# Patient Record
Sex: FEMALE | Race: WHITE | URBAN - METROPOLITAN AREA
[De-identification: names, ages, dates, MRNs, and addresses within clinical notes are randomized per-mention and may not be internally consistent; named-entity substitution may affect disease eponyms.]

---

## 2017-10-19 ENCOUNTER — IMPORTED ENCOUNTER (OUTPATIENT)
Dept: URBAN - METROPOLITAN AREA CLINIC 50 | Facility: CLINIC | Age: 67
End: 2017-10-19

## 2020-12-04 ENCOUNTER — IMPORTED ENCOUNTER (OUTPATIENT)
Dept: URBAN - METROPOLITAN AREA CLINIC 50 | Facility: CLINIC | Age: 70
End: 2020-12-04

## 2020-12-04 NOTE — PATIENT DISCUSSION
"""OU: mild tortuosity. OS: av nicking. Patient has a history of high blood pressure.  Advised patient ""

## 2021-04-17 ASSESSMENT — VISUAL ACUITY
OS_CC: J1
OD_CC: 20/20
OS_CC: 20/20
OS_CC: J1+
OS_OTHER: 20/25. 20/40.
OD_BAT: 20/30
OD_PH: 20/40
OD_OTHER: 20/30.
OS_CC: 20/25-
OD_CC: 20/50-+
OS_BAT: 20/25
OD_OTHER: 20/30. 20/40.
OD_CC: J1+
OD_CC: J1
OS_OTHER: 20/30.

## 2021-04-17 ASSESSMENT — TONOMETRY
OD_IOP_MMHG: 12
OD_IOP_MMHG: 12
OS_IOP_MMHG: 12
OS_IOP_MMHG: 12

## 2021-12-01 ENCOUNTER — PREPPED CHART (OUTPATIENT)
Dept: URBAN - METROPOLITAN AREA CLINIC 49 | Facility: CLINIC | Age: 71
End: 2021-12-01

## 2021-12-03 ENCOUNTER — COMPREHENSIVE EXAM (OUTPATIENT)
Dept: URBAN - METROPOLITAN AREA CLINIC 49 | Facility: CLINIC | Age: 71
End: 2021-12-03

## 2021-12-03 DIAGNOSIS — H25.813: ICD-10-CM

## 2021-12-03 DIAGNOSIS — H02.834: ICD-10-CM

## 2021-12-03 DIAGNOSIS — H02.831: ICD-10-CM

## 2021-12-03 DIAGNOSIS — H35.013: ICD-10-CM

## 2021-12-03 PROCEDURE — 92014 COMPRE OPH EXAM EST PT 1/>: CPT

## 2021-12-03 NOTE — PATIENT DISCUSSION
Mild hypertensive changes OU. Patient has a history of high blood pressure. Advised patient to continue care with PCP and to monitor blood pressure.

## 2021-12-06 ASSESSMENT — VISUAL ACUITY
OU_CC: J1+
OD_GLARE: 20/40
OD_GLARE: 20/80
OU_CC: 20/20
OD_CC: 20/25
OS_GLARE: 20/40
OS_GLARE: 20/25
OS_CC: 20/25+

## 2021-12-06 ASSESSMENT — TONOMETRY
OS_IOP_MMHG: 12
OD_IOP_MMHG: 12

## 2023-01-30 ENCOUNTER — COMPREHENSIVE EXAM (OUTPATIENT)
Dept: URBAN - METROPOLITAN AREA CLINIC 53 | Facility: CLINIC | Age: 73
End: 2023-01-30

## 2023-01-30 DIAGNOSIS — H35.013: ICD-10-CM

## 2023-01-30 DIAGNOSIS — H25.813: ICD-10-CM

## 2023-01-30 DIAGNOSIS — H16.223: ICD-10-CM

## 2023-01-30 DIAGNOSIS — H52.4: ICD-10-CM

## 2023-01-30 DIAGNOSIS — H02.831: ICD-10-CM

## 2023-01-30 PROCEDURE — 92014 COMPRE OPH EXAM EST PT 1/>: CPT

## 2023-01-30 PROCEDURE — 92015 DETERMINE REFRACTIVE STATE: CPT

## 2023-01-30 ASSESSMENT — KERATOMETRY
OD_AXISANGLE_DEGREES: 96
OS_K2POWER_DIOPTERS: 45.50
OD_K1POWER_DIOPTERS: 44.25
OD_AXISANGLE2_DEGREES: 6
OS_AXISANGLE_DEGREES: 59
OS_K1POWER_DIOPTERS: 45.00
OD_K2POWER_DIOPTERS: 46.00
OS_AXISANGLE2_DEGREES: 149

## 2023-01-30 ASSESSMENT — VISUAL ACUITY
OU_CC: 20/20-1
OD_GLARE: 20/30
OS_GLARE: 20/40
OS_CC: 20/30
OD_CC: 20/25
OS_PH: 20/25
OD_GLARE: 20/50
OS_GLARE: 20/60
OU_SC: J1

## 2023-01-30 ASSESSMENT — TONOMETRY
OD_IOP_MMHG: 12
OS_IOP_MMHG: 11

## 2024-02-12 ENCOUNTER — COMPREHENSIVE EXAM (OUTPATIENT)
Dept: URBAN - METROPOLITAN AREA CLINIC 53 | Facility: CLINIC | Age: 74
End: 2024-02-12

## 2024-02-12 DIAGNOSIS — H16.223: ICD-10-CM

## 2024-02-12 DIAGNOSIS — H02.831: ICD-10-CM

## 2024-02-12 DIAGNOSIS — H02.834: ICD-10-CM

## 2024-02-12 DIAGNOSIS — H52.4: ICD-10-CM

## 2024-02-12 DIAGNOSIS — H35.013: ICD-10-CM

## 2024-02-12 DIAGNOSIS — H25.813: ICD-10-CM

## 2024-02-12 PROCEDURE — 92015 DETERMINE REFRACTIVE STATE: CPT

## 2024-02-12 PROCEDURE — 99214 OFFICE O/P EST MOD 30 MIN: CPT

## 2024-02-12 PROCEDURE — 92134 CPTRZ OPH DX IMG PST SGM RTA: CPT

## 2024-02-12 ASSESSMENT — VISUAL ACUITY
OD_GLARE: 20/25
OS_GLARE: 20/25
OU_SC: J1@16"
OD_CC: 20/30-1
OD_GLARE: 20/20-1
OS_CC: 20/25-1
OS_GLARE: 20/25
OD_PH: 20/25

## 2024-02-12 ASSESSMENT — TONOMETRY
OS_IOP_MMHG: 15
OD_IOP_MMHG: 15

## 2025-07-14 ENCOUNTER — COMPREHENSIVE EXAM (OUTPATIENT)
Age: 75
End: 2025-07-14

## 2025-07-14 DIAGNOSIS — H02.831: ICD-10-CM

## 2025-07-14 DIAGNOSIS — H52.4: ICD-10-CM

## 2025-07-14 DIAGNOSIS — H16.223: ICD-10-CM

## 2025-07-14 DIAGNOSIS — H35.013: ICD-10-CM

## 2025-07-14 DIAGNOSIS — H25.813: ICD-10-CM

## 2025-07-14 PROCEDURE — 92015 DETERMINE REFRACTIVE STATE: CPT

## 2025-07-14 PROCEDURE — 99214 OFFICE O/P EST MOD 30 MIN: CPT

## 2025-07-14 PROCEDURE — 92134 CPTRZ OPH DX IMG PST SGM RTA: CPT
